# Patient Record
(demographics unavailable — no encounter records)

---

## 2025-05-05 NOTE — PHYSICAL EXAM
[Normocephalic] : normocephalic [EOMI] : extra ocular movement intact [Supple] : supple [No Supraclavicular Adenopathy] : no supraclavicular adenopathy [No Cervical Adenopathy] : no cervical adenopathy [de-identified] : Bilateral breast/axilla/supraclavicular area: No masses, discharge, or adenopathy\par

## 2025-05-05 NOTE — HISTORY OF PRESENT ILLNESS
[FreeTextEntry1] : Patient is a 51 yo F who presents for breast cancer screening. She is followed for positive family hx of breast cancer in paternal grandmother, dx 50's (no genetics). Patient denies palpable masses, skin changes, or nipple discharge bilaterally.   KD- 14.9% 2022 2/27/19: B/L MG - no evidence of disease  4/22/20: B/L MG- Fibroglandular. BIRADS 1. 4/23/21: B/L MG- Fibroglandular. BIRADS 1. 4/27/22: B/L MG & US- scattered fibroglandular. R 0.5cm simple cyst 8:00 2FN. ИВАН. BI-RADS 2 4/27/23: B/l MG- scattered fibroglandular. ИВАН. BIRADS 1. 5/1/24: B/L MG- scattered fibroglandular. ИВАН. BIRADS 2. 5/5/25 B/L MG-scattered fibroglandular. ИВАН. BIRADS 1

## 2025-05-05 NOTE — PHYSICAL EXAM
[Normocephalic] : normocephalic [EOMI] : extra ocular movement intact [Supple] : supple [No Supraclavicular Adenopathy] : no supraclavicular adenopathy [No Cervical Adenopathy] : no cervical adenopathy [de-identified] : Bilateral breast/axilla/supraclavicular area: No masses, discharge, or adenopathy\par

## 2025-05-05 NOTE — PAST MEDICAL HISTORY
[Menstruating] : The patient is menstruating [Menarche Age ____] : age at menarche was [unfilled] [Definite ___ (Date)] : the last menstrual period was [unfilled] [Regular Cycle Intervals] : have been regular [Total Preg ___] : G[unfilled] [Abortions ___] : Abortions:[unfilled] [History of Hormone Replacement Treatment] : has no history of hormone replacement treatment [FreeTextEntry6] : no [FreeTextEntry7] : YES   [FreeTextEntry8] : N/A